# Patient Record
Sex: FEMALE | Race: WHITE | NOT HISPANIC OR LATINO
[De-identification: names, ages, dates, MRNs, and addresses within clinical notes are randomized per-mention and may not be internally consistent; named-entity substitution may affect disease eponyms.]

---

## 2018-06-18 ENCOUNTER — RECORDS - HEALTHEAST (OUTPATIENT)
Dept: ADMINISTRATIVE | Facility: OTHER | Age: 53
End: 2018-06-18

## 2018-08-21 ENCOUNTER — RECORDS - HEALTHEAST (OUTPATIENT)
Dept: RADIOLOGY | Facility: CLINIC | Age: 53
End: 2018-08-21

## 2018-08-23 ENCOUNTER — OFFICE VISIT - HEALTHEAST (OUTPATIENT)
Dept: NEUROSURGERY | Facility: CLINIC | Age: 53
End: 2018-08-23

## 2018-08-23 DIAGNOSIS — M79.605 PAIN OF LEFT LOWER EXTREMITY: ICD-10-CM

## 2018-08-23 RX ORDER — ATORVASTATIN CALCIUM 80 MG/1
TABLET, FILM COATED ORAL
Status: SHIPPED | COMMUNITY
Start: 2018-07-26

## 2018-08-23 RX ORDER — HYDROCODONE BITARTRATE AND ACETAMINOPHEN 5; 325 MG/1; MG/1
1 TABLET ORAL EVERY 4 HOURS PRN
Status: SHIPPED | COMMUNITY
Start: 2018-08-23

## 2018-08-23 ASSESSMENT — MIFFLIN-ST. JEOR: SCORE: 1481.73

## 2018-08-30 ENCOUNTER — RECORDS - HEALTHEAST (OUTPATIENT)
Dept: ADMINISTRATIVE | Facility: OTHER | Age: 53
End: 2018-08-30

## 2018-09-05 ENCOUNTER — HOSPITAL ENCOUNTER (OUTPATIENT)
Dept: PHYSICAL MEDICINE AND REHAB | Facility: CLINIC | Age: 53
Discharge: HOME | End: 2018-09-05
Attending: SURGERY

## 2018-09-05 DIAGNOSIS — Z98.890 PREVIOUS BACK SURGERY: ICD-10-CM

## 2018-09-05 DIAGNOSIS — M47.26 OSTEOARTHRITIS OF SPINE WITH RADICULOPATHY, LUMBAR REGION: ICD-10-CM

## 2018-09-05 DIAGNOSIS — M79.18 MYOFASCIAL PAIN: ICD-10-CM

## 2018-09-05 DIAGNOSIS — M79.662 PAIN OF LEFT LOWER LEG: ICD-10-CM

## 2018-09-05 ASSESSMENT — MIFFLIN-ST. JEOR: SCORE: 1565.98

## 2018-09-12 ENCOUNTER — RECORDS - HEALTHEAST (OUTPATIENT)
Dept: RADIOLOGY | Facility: CLINIC | Age: 53
End: 2018-09-12

## 2018-09-19 ENCOUNTER — COMMUNICATION - HEALTHEAST (OUTPATIENT)
Dept: PHYSICAL MEDICINE AND REHAB | Facility: CLINIC | Age: 53
End: 2018-09-19

## 2018-09-19 ENCOUNTER — AMBULATORY - HEALTHEAST (OUTPATIENT)
Dept: NEUROSURGERY | Facility: CLINIC | Age: 53
End: 2018-09-19

## 2018-09-28 ENCOUNTER — RECORDS - HEALTHEAST (OUTPATIENT)
Dept: ADMINISTRATIVE | Facility: OTHER | Age: 53
End: 2018-09-28

## 2018-10-02 ENCOUNTER — COMMUNICATION - HEALTHEAST (OUTPATIENT)
Dept: PHYSICAL MEDICINE AND REHAB | Facility: CLINIC | Age: 53
End: 2018-10-02

## 2018-10-08 ENCOUNTER — COMMUNICATION - HEALTHEAST (OUTPATIENT)
Dept: PHYSICAL MEDICINE AND REHAB | Facility: CLINIC | Age: 53
End: 2018-10-08

## 2018-10-08 DIAGNOSIS — M79.662 PAIN OF LEFT LOWER LEG: ICD-10-CM

## 2018-10-08 DIAGNOSIS — M79.18 MYOFASCIAL PAIN: ICD-10-CM

## 2018-10-08 DIAGNOSIS — M47.26 OSTEOARTHRITIS OF SPINE WITH RADICULOPATHY, LUMBAR REGION: ICD-10-CM

## 2018-11-14 ENCOUNTER — COMMUNICATION - HEALTHEAST (OUTPATIENT)
Dept: PHYSICAL MEDICINE AND REHAB | Facility: CLINIC | Age: 53
End: 2018-11-14

## 2018-12-13 ENCOUNTER — COMMUNICATION - HEALTHEAST (OUTPATIENT)
Dept: PHYSICAL MEDICINE AND REHAB | Facility: CLINIC | Age: 53
End: 2018-12-13

## 2018-12-13 DIAGNOSIS — M79.662 PAIN OF LEFT LOWER LEG: ICD-10-CM

## 2018-12-13 DIAGNOSIS — M79.18 MYOFASCIAL PAIN: ICD-10-CM

## 2018-12-13 DIAGNOSIS — M47.26 OSTEOARTHRITIS OF SPINE WITH RADICULOPATHY, LUMBAR REGION: ICD-10-CM

## 2018-12-14 ENCOUNTER — COMMUNICATION - HEALTHEAST (OUTPATIENT)
Dept: PHYSICAL MEDICINE AND REHAB | Facility: CLINIC | Age: 53
End: 2018-12-14

## 2019-01-03 ENCOUNTER — COMMUNICATION - HEALTHEAST (OUTPATIENT)
Dept: PHYSICAL MEDICINE AND REHAB | Facility: CLINIC | Age: 54
End: 2019-01-03

## 2019-01-22 ENCOUNTER — HOSPITAL ENCOUNTER (OUTPATIENT)
Dept: PHYSICAL MEDICINE AND REHAB | Facility: CLINIC | Age: 54
Discharge: HOME | End: 2019-01-22
Attending: PAIN MEDICINE

## 2019-01-22 DIAGNOSIS — M79.18 MYOFASCIAL PAIN: ICD-10-CM

## 2019-01-22 DIAGNOSIS — M79.662 PAIN OF LEFT LOWER LEG: ICD-10-CM

## 2019-01-22 DIAGNOSIS — M47.26 OSTEOARTHRITIS OF SPINE WITH RADICULOPATHY, LUMBAR REGION: ICD-10-CM

## 2019-01-22 DIAGNOSIS — M16.12 PRIMARY OSTEOARTHRITIS OF LEFT HIP: ICD-10-CM

## 2019-01-22 DIAGNOSIS — Z98.890 PREVIOUS BACK SURGERY: ICD-10-CM

## 2019-01-22 RX ORDER — ROSUVASTATIN CALCIUM 40 MG/1
40 TABLET, COATED ORAL AT BEDTIME
Status: SHIPPED | COMMUNITY
Start: 2019-01-22

## 2019-01-23 ENCOUNTER — RECORDS - HEALTHEAST (OUTPATIENT)
Dept: ADMINISTRATIVE | Facility: OTHER | Age: 54
End: 2019-01-23

## 2019-02-11 ENCOUNTER — COMMUNICATION - HEALTHEAST (OUTPATIENT)
Dept: PHYSICAL MEDICINE AND REHAB | Facility: CLINIC | Age: 54
End: 2019-02-11

## 2019-02-18 ENCOUNTER — HOSPITAL ENCOUNTER (OUTPATIENT)
Dept: PHYSICAL MEDICINE AND REHAB | Facility: CLINIC | Age: 54
Discharge: HOME | End: 2019-02-18
Attending: PAIN MEDICINE

## 2019-02-18 DIAGNOSIS — M79.662 PAIN OF LEFT LOWER LEG: ICD-10-CM

## 2019-02-18 DIAGNOSIS — M47.26 OSTEOARTHRITIS OF SPINE WITH RADICULOPATHY, LUMBAR REGION: ICD-10-CM

## 2019-02-18 DIAGNOSIS — M79.18 MYOFASCIAL PAIN: ICD-10-CM

## 2019-02-18 DIAGNOSIS — M16.12 PRIMARY OSTEOARTHRITIS OF LEFT HIP: ICD-10-CM

## 2019-02-18 DIAGNOSIS — Z98.890 PREVIOUS BACK SURGERY: ICD-10-CM

## 2019-02-18 RX ORDER — TOPIRAMATE 50 MG/1
100 TABLET, FILM COATED ORAL 2 TIMES DAILY
Qty: 120 TABLET | Refills: 1 | Status: SHIPPED | OUTPATIENT
Start: 2019-02-18

## 2019-03-04 ENCOUNTER — COMMUNICATION - HEALTHEAST (OUTPATIENT)
Dept: PHYSICAL MEDICINE AND REHAB | Facility: CLINIC | Age: 54
End: 2019-03-04

## 2019-03-21 ENCOUNTER — COMMUNICATION - HEALTHEAST (OUTPATIENT)
Dept: PHYSICAL MEDICINE AND REHAB | Facility: CLINIC | Age: 54
End: 2019-03-21

## 2019-03-22 ENCOUNTER — AMBULATORY - HEALTHEAST (OUTPATIENT)
Dept: PHYSICAL MEDICINE AND REHAB | Facility: CLINIC | Age: 54
End: 2019-03-22

## 2019-03-22 DIAGNOSIS — M16.12 PRIMARY OSTEOARTHRITIS OF LEFT HIP: ICD-10-CM

## 2019-04-05 ENCOUNTER — HOSPITAL ENCOUNTER (OUTPATIENT)
Dept: PHYSICAL MEDICINE AND REHAB | Facility: CLINIC | Age: 54
Discharge: HOME | End: 2019-04-05
Attending: PAIN MEDICINE

## 2019-04-05 DIAGNOSIS — M16.12 PRIMARY OSTEOARTHRITIS OF LEFT HIP: ICD-10-CM

## 2019-04-11 ENCOUNTER — COMMUNICATION - HEALTHEAST (OUTPATIENT)
Dept: PHYSICAL MEDICINE AND REHAB | Facility: CLINIC | Age: 54
End: 2019-04-11

## 2019-04-11 DIAGNOSIS — M16.12 OSTEOARTHRITIS OF LEFT HIP: ICD-10-CM

## 2019-04-12 ENCOUNTER — RECORDS - HEALTHEAST (OUTPATIENT)
Dept: ADMINISTRATIVE | Facility: OTHER | Age: 54
End: 2019-04-12

## 2019-04-24 ENCOUNTER — HOSPITAL ENCOUNTER (OUTPATIENT)
Dept: PHYSICAL MEDICINE AND REHAB | Facility: CLINIC | Age: 54
Discharge: HOME | End: 2019-04-24
Attending: PAIN MEDICINE

## 2019-04-24 DIAGNOSIS — M16.12 OSTEOARTHRITIS OF LEFT HIP: ICD-10-CM

## 2019-07-05 ENCOUNTER — COMMUNICATION - HEALTHEAST (OUTPATIENT)
Dept: PHYSICAL MEDICINE AND REHAB | Facility: CLINIC | Age: 54
End: 2019-07-05

## 2019-07-05 DIAGNOSIS — M79.18 MYOFASCIAL PAIN: ICD-10-CM

## 2019-07-05 DIAGNOSIS — M79.662 PAIN OF LEFT LOWER LEG: ICD-10-CM

## 2019-07-05 DIAGNOSIS — M47.26 OSTEOARTHRITIS OF SPINE WITH RADICULOPATHY, LUMBAR REGION: ICD-10-CM

## 2019-07-05 RX ORDER — GABAPENTIN 100 MG/1
CAPSULE ORAL
Qty: 720 CAPSULE | Refills: 1 | Status: SHIPPED | OUTPATIENT
Start: 2019-07-05

## 2021-05-26 NOTE — TELEPHONE ENCOUNTER
Pt returned call. Discussed provider's response. Pt would like to proceed with the diagnostic hip injection under US. Pt will have  drive her the day of procedure. Transferred to scheduling to make appt.

## 2021-05-26 NOTE — TELEPHONE ENCOUNTER
Please call the patient and let her know that I would like to do a diagnostic(numbing medication only) left hip injection under ultrasound guidance?  If this helps then I would repeat the injection with steroid.  I will order the injection.  Could you please have her scheduled for the injection.

## 2021-05-27 NOTE — PATIENT INSTRUCTIONS - HE
DISCHARGE INSTRUCTIONS    During office hours (8:00 a.m.- 4:30 p.m.) questions or concerns may be answered  by calling  [phone] or spine navigation [phone]. If you experience any problems after hours  please call [phone] and you will be connected to [facility]     All Patients:  ? You may experience an increase in your symptoms for the first 2 days, once the numbing medication wears off.    ? You may resume your regular medication, no pain medication until you have completed your diary    ? You may shower. No swimming, tub bath or hot tub for 24 hours; remove bandage after 4 hours                       POSSIBLE PROCEDURE SIDE EFFECTS  -Call Spine Center if concerned-    Increased Pain  Increased numbness/tingling     Nausea/Vomiting  Bruising/bleeding at site (hematoma)             Swelling at site (edema) Headache  Difficulty walking  Infection        Fever greater than 100.5

## 2021-05-28 NOTE — PATIENT INSTRUCTIONS - HE
DISCHARGE INSTRUCTIONS    During office hours (8:00 a.m.- 4:30 p.m.) questions or concerns may be answered  by calling Spine Navigation Nurses at  [phone].     If you experience any problems after hours  please call [phone] and you will be connected to [facility].     All Patients:    ? You may experience an increase in your symptoms for the first 2 days (It may take anywhere between 2 days- 2 weeks for the steroid to have maximum effect).    ? You may use ice on the injection site, as frequently as 20 minutes each hour if needed.    ? You may take your pain medicine.    ? You may continue taking your regular medication after your injection. If you have had a Medial Branch Block you may resume pain medication once your pain diary is completed.    ? You may shower. No swimming, tub bath or hot tub for 48 hours.  You may remove your bandaid/bandage as soon as you are home.    ? You may resume light activities, as tolerated.    ? Resume your usual diet as tolerated.    ? It is strongly advised that you do not drive for 1-3 hours post injection.    ? If you have had oral sedation:  Do not drive for 8 hours post injection.      ? If you have had IV sedation:  Do not drive for 24 hours post injection.  Do not operate hazardous machinery or make important personal/business decisions for 24 hours.      POSSIBLE STEROID SIDE EFFECTS (If steroid/cortisone was used for your procedure)    -If you experience these symptoms, it should only last for a short period      Swelling of the legs                Skin redness (flushing)       Mouth (oral) irritation     Blood sugar (glucose) levels              Sweats                      Mood changes    Headache    Sleeplessness         POSSIBLE PROCEDURE SIDE EFFECTS  -Call the Spine Center if you are concerned    Increased Pain             Increased numbness/tingling        Nausea/Vomiting            Bruising/bleeding at site        Redness or swelling                                                 Difficulty walking        Weakness             Fever greater than 100.5    *In the event of a severe headache after an epidural steroid injection that is relieved by lying down, please call the [facility] to speak with a clinical staff member*

## 2021-06-01 VITALS — WEIGHT: 187 LBS | HEIGHT: 68 IN | BODY MASS INDEX: 28.34 KG/M2

## 2021-06-01 VITALS — HEIGHT: 67 IN | BODY MASS INDEX: 32.68 KG/M2 | WEIGHT: 208.2 LBS

## 2021-06-02 VITALS — WEIGHT: 215.1 LBS | BODY MASS INDEX: 33.44 KG/M2

## 2021-06-02 VITALS — BODY MASS INDEX: 33.42 KG/M2 | WEIGHT: 215 LBS

## 2021-06-03 VITALS — WEIGHT: 215 LBS | BODY MASS INDEX: 33.42 KG/M2

## 2021-06-20 NOTE — PROGRESS NOTES
Patient is a 52-year-old female.  She complains of left leg pain.  Onset 3 months ago.  Minimal back pain.  No right leg symptoms.  In addition to the left leg pain she also complains of left leg weakness and numbness.  She has not had any recent conservative treatment like physical therapy or steroids.  She has a history of 2 lumbar laminectomies for synovial cysts.  The second was done by Dr. Yi at [facility].  It helped.  Past medical history is negative.  No heart disease, lung disease, cancer, diabetes.  On exam her BMI is 28.  She limps on her right leg which is opposite of  what one would expect with left leg pain.  She has trouble walking on her tiptoes.  Heel walking is okay.  Reflexes are active and symmetrical.  Good strength.  No atrophy.  Sensation intact.  Straight leg raising positive on the left.  Yonny's sign negative.  MRI looks fairly unremarkable to me.  There is minimal listhesis at L4-5.  The disc at that level looks healthy.  The foramen on the left is patent.  No synovial cyst compressing the left nerve.  There is a small synovial cyst on the asymptomatic, right side.  A doctor from [facility] recommended a fusion but that does not make sense to me.  I would try some conservative treatment at the [facility] spine center.  Probably try an epidural or transforaminal steroid injection.  I would get some flexion-extension views to see if there is any abnormal motion at L4-5.  I would get some thin CT cuts to look for a pars defect.  The cause of the left leg pain remains unknown.  Maybe a neurologist and EMG could help.  I showed the MRI pictures to the patient and her .  They are satisfied with the plan.  Total time 45 minutes, more than 50% spent counseling and/or coordinating care.

## 2021-06-20 NOTE — PROGRESS NOTES
ASSESSMENT: Erna Soriano is a 53 y.o. female who presents for consultation at the request of HE PCP Danica Quiñones PA-C, with a past medical history significant for lumbar facet cyst removal in 2013 and 14, hyperlipidemia, left leg pain who presents today for new patient evaluation of:    -Overall the patient's physical exam is very reassuring that she has normal strength and reflexes.  Does seem that portion of her pain is myofascial.  Also possible that some of the pain could be coming from foraminal stenosis at L4-5.    Patient is neurologically intact on exam. No myelopathic or red flag symptoms.     LEROY Score: 54    WHO 5: 16       Diagnoses and all orders for this visit:    Pain of left lower leg    Osteoarthritis of spine with radiculopathy, lumbar region    Myofascial pain      PLAN:  Reviewed spine anatomy and disease process. Discussed diagnosis and treatment options with the patient today. A shared decision making model was used.  The patient's values and choices were respected. The following represents what was discussed and decided upon by the provider and the patient.      -DIAGNOSTIC TESTS:  Images were personally reviewed and interpreted and explained to patient today using spine model.   --Personally reviewed and interpreted the CT of the lumbar spine which does show a spondylolisthesis of L3 on L4 and L4 on L5.  Postsurgical changes of the laminectomy at left L4-5 as well as mild central spinal canal stenosis at L4-5.  There is also mild to moderate neural foraminal stenosis bilaterally at L4-5.  She has moderate facet arthropathy on the right at L5-S1.  --Reviewed the report of the flexion and extension x-rays of her lumbar spine on 8/28/2018 which does show a 7 mm anterolisthesis of L4 on L5.  They are unchanged with flexion and extension.  There is also a 3 mm retrolisthesis of L3 on L4 which is also unchanged with flexion and extension.  --I also personally reviewed and interpreted images  from 8/21/2018 of an MRI of the lumbar spine.  It does again show mild central canal stenosis at L4-5 as well as bilateral foraminal stenosis at L4-5.  There is facet arthropathy at L5-S1.  --She will be seeing neurology and likely have an EMG study done.  --I have ordered a BMP to look at renal function as she is taking ibuprofen.    -PHYSICAL THERAPY: I have ordered physical therapy for the patient to work on core strengthening as well as nerve gliding.  Thus that the patient be given a home-going exercise program that she can do on a daily basis.    Discussed the importance of core strengthening, ROM, stretching exercises with the patient and how each of these entities is important in decreasing pain.  Explained to the patient that the purpose of physical therapy is to teach the patient a home exercise program.  These exercises need to be performed every day in order to decrease pain and prevent future occurrences of pain.        -MEDICATIONS: Start the patient on gabapentin 100 mg at bedtime she will increase in a stepwise fashion until she is at 300 mg 3 times a day.  We discussed that she could safely increase to 600 mg 3 times a day as tolerated.    Discussed multiple medication options today with patient. Discussed risks, side effects, and proper use of medications. Patient verbalized understanding.    -INTERVENTIONS: No interventions at this time.  In the future could consider a left L4-5 transforaminal epidural steroid injection.  Discussed risks and benefits of injections with patient today.    -PATIENT EDUCATION:  45 minutes of total visit time was spent face to face with the patient today, greater than 50% of total time spent with patient was spent on counseling, education, and coordinating care.   -10 minutes spent outside of visit time, non-face-to-face time, reviewing chart.    -FOLLOW-UP:   I will follow-up with the patient in 4 weeks.    Advised patient to call the Spine Center if symptoms worsen  or you have problems controlling bladder and bowel function.   ______________________________________________________________________    SUBJECTIVE:  HPI:  Erna Soriano  Is a 53 y.o. female who presents today for new patient evaluation of primarily left buttock and occasional left lower limb pain patient reports that she has had 2 lumbar surgeries one in 2013 and another in 2014 for cyst removal in the lumbar spine.  At that time she was having left lower limb pain and describes that it was in her left buttock down the posterior thigh and posterior calf.  After removal of the cyst in 2014 she had been pain-free in her left lower limb until about May 2018.  Since that time she has had worsening pain in her left buttock and posterior thigh and calf.  She reports is similar to the pain that she had prior to surgery.  She met with Dr. Denney in neurosurgery he felt that a more conservative approach to her treatment plan would be best.  Patient has not had physical therapy nor any recent injections.  She is currently taking ibuprofen 600-800 mg 3-4 times daily.  This does help her pain a little bit.  She was on Vicodin which she felt helped a little bit more but is no longer taking this.  Her pain is currently 10 out of 10 and describes the pain as throbbing and achy.  90% of her pain is in her left buttock area with remaining 10% down her leg.  Prior to her last surgery she had tried gabapentin and does not remember what dose she was on but does remember that she felt that she was woozy on this medication.    -Treatment to Date:Prior to her second lumbar surgery she did have a cortisone injection as well as a cyst rupture procedure with no long-lasting results L4-5 laminectomy for cyst removal in 2013 and 14.  She has not had physical therapy.    -Medications:    Current Outpatient Prescriptions on File Prior to Encounter   Medication Sig Dispense Refill     atorvastatin (LIPITOR) 80 MG tablet TAKE 1 TABLET BY ORAL  "ROUTE EVERY DAY       HYDROcodone-acetaminophen 5-325 mg per tablet Take 1 tablet by mouth every 4 (four) hours as needed for pain.       No current facility-administered medications on file prior to encounter.        No Known Allergies    Past Medical History:   Diagnosis Date     Hypertension       Surgical history  Left L4-5 laminectomy for cyst removal in 2013 and 14.    Family History   Problem Relation Age of Onset     Cancer Mother      Hypertension Father      Hypertension Brother        Reviewed past medical, surgical, and family history with patient found on new patient intake packet located in EMR Media tab.     SOCIAL HX: She is  and works for [employer].  She does not smoke, drinks alcohol occasionally, and does not use recreational drugs.    ROS:  Specifically negative for bowel/bladder dysfunction, balance changes, headache, dizziness, foot drop, fevers, chills, appetite changes, nausea/vomiting, unexplained weight loss. Otherwise 13 systems reviewed are negative. Please see the patient's intake questionnaire from today for details.    OBJECTIVE:  /88  Pulse 92  Resp 19  Ht 5' 7.25\" (1.708 m)  Wt 208 lb 3.2 oz (94.4 kg)  SpO2 96%  BMI 32.37 kg/m2    PHYSICAL EXAMINATION:    --CONSTITUTIONAL:  Vital signs as above.  No acute distress.  The patient is well nourished and well groomed.  --PSYCHIATRIC:  Appropriate mood and affect. The patient is awake, alert, oriented to person, place, time and answering questions appropriately with clear speech.    --SKIN:  Skin over the face, bilateral lower extremities, and posterior torso is clean, dry, intact without rashes.    --RESPIRATORY: Normal rhythm and effort. No abnormal accessory muscle breathing patterns noted.   --ABDOMINAL:  Soft, non-distended, non-tender throughout all quadrants.   --STANDING EXAMINATION:  Normal lumbar lordosis noted, no lateral shift.  --MUSCULOSKELETAL: Lumbar spine inspection reveals no evidence " of deformity. Range of motion is not limited in lumbar flexion, extension, lateral rotation.  She does have increased buttock pain on the left side with flexion and extension.  Mild tenderness to palpation in the left buttock.  Straight leg raise is negative bilaterally.  --SACROILIAC JOINT: Right Giorgio's with reproduction of pain to affected extremity.  Negative Gaenslen's Test with reproduction of pain to affected extremity.  --GROSS MOTOR: Gait is non-antalgic. Easily arises from a seated position. Toe walking and heel walking are normal without significant difficulty.    --LOWER EXTREMITY MOTOR TESTING:  Plantar flexion left 5/5, right 5/5   Dorsiflexion left 5/5, right 5/5   Great toe MTP extension left 5/5, right 5/5  Knee flexion left 5/5, right 5/5  Knee extension left 5/5, right 5/5   Hip flexion left 5/5, right 5/5  Hip abduction left 5/5, right 5/5  Hip adduction left 5/5, right 5/5   --HIPS: Full range of motion bilaterally.  Stinchfield test is negative bilaterally  --NEUROLOGICAL:  2/4 patellar, medial hamstring, and achilles reflexes bilaterally.  Sensation to light touch is intact in the bilateral L4, L5, and S1 dermatomes. Babinski is negative. No clonus.  Negative Alfonso reflex bilaterally.  --VASCULAR:  2/4 dorsalis pedis and posterior tibialsi pulses bilaterally.  Bilateral lower extremities are warm.  There is no pitting edema of the bilateral lower extremities.    RESULTS: Prior medical records from [facility] neurosurgery were reviewed today.    Imaging: Lumbar spine Imaging was personally reviewed and interpreted today. The images were shown to the patient and the findings were explained using a spine model.

## 2021-06-20 NOTE — PROGRESS NOTES
Neurosurgery consultation was requested by: a friend a surgery nurse   Pain: back   Radicular Pain is present: lower back goes down to her left leg to her toes  Lhermitte sign: no  Motor complaints: left leg   Sensory complaints: left leg   Gait and balance issues: yes   Bowel or bladder issues: no  Duration of SX is: 3 months   The symptoms are worse with: everything   The symptoms are better with: nothing   Injury: cyst   Severity is: mild to moderate   Patient has tried the following conservative measures: 1 injection lasted for a week   LEROY score is: 50%  JERSON Polanco MA

## 2021-06-22 NOTE — TELEPHONE ENCOUNTER
Patient called back to request a new MRI. She stated that she had a cyst in the past and is curious if it has returned. She also stated that she absolutely does not want to increase the Gabapentin. She will be weaning off of it due to weight gain and ineffectiveness.     Provided providers recommendations, patient would be willing to come in for a follow-up but would like to know if the provider would order a MRI first. If so, she would like it to go to [facility].     Informed patient that her message would be given to the provider and she would be called back. Patient understood and call was ended.

## 2021-06-22 NOTE — TELEPHONE ENCOUNTER
Could you please schedule a follow up appointment with the patient, I haven't seen her since September?  If she has had at least 4 sessions of PT in the last 6 months, we could also schedule a left L4-5 transforaminal epidural steroid injection on the same day directly after the appointment.      I would recommend that the patient slowly increase the amount of gabapentin until she is on 600 mg three times a day.  She could add a dose every 3rd day, similar to how she went up originally.    Thanks,    Sean

## 2021-06-22 NOTE — TELEPHONE ENCOUNTER
Patient would like to move forward with the L4-5 TFESI. She is currently taking Gabapentin 100 mg 3x3x3 which is no longer providing pain control. She will begin tapering down today.     Informed patient that her message would be sent to the provider. Patient is aware that if injection is ordered she may need to wait for prior auth. Last PT @ [facility] 9/2018.

## 2021-06-22 NOTE — TELEPHONE ENCOUNTER
Patient returned call. Wondering about an MRI and if it would include her groin area as she is now experiencing pain there too. Explained an MRI has not been ordered and she needs to make a follow up for provider to determine which MRI, if any, would be ordered. Stated understanding.     She did mention the injection that was offered to her. Patient however has only had 2 sessions of PT and then given HEP. She was told to return if pain worsened which it has. Encouraged her to reach out to PT and ask fo a couple more sessions of PT. Stated understanding. Transferred to scheduling to make follow up appointment.

## 2021-06-23 NOTE — PATIENT INSTRUCTIONS - HE
Recommend that he continue doing physical therapy sessions to strengthen core.  Please do the exercises on a daily basis.    We will hold off on getting an MRI at this point.    I have ordered Topamax for you please take 50 mg at bedtime for 1 week and then at that point take 50 mg in the morning and at bedtime.  After taking this for a week you can go ahead and wean off of gabapentin completely.    ~Please call Nurse Navigation line [phone] with any questions or concerns about your treatment plan, if symptoms worsen and you would like to be seen urgently, or if you have problems controlling bladder and bowel function.

## 2021-06-23 NOTE — TELEPHONE ENCOUNTER
Call to pt. Pt reports she is not having any respiratory problems with this medication. Informed pt of provider's recommendations. She will stop and get an antihistamine to see if this is helpful. Advised pt to call back if any other issues arise. Transferred to scheduling to make follow up appt.

## 2021-06-23 NOTE — TELEPHONE ENCOUNTER
"Call from pt to give status update since last appt on 1/22 with Dr. Johnson. Pt states at this appt she was prescribed Topamax 50 mg. She began this medication on 1/23, 50 mg at bedtime for 1 week and then titrated up to 50 mg 2x/daily. Pt reports \"this medication really does seem to work and I really like it, I've just been getting this itchiness, prickliness and dry skin lately from it\". Pt reports this occurs on her arms, back and face. She states \"it's not bad or anything by all means, but it's just enough to be annoying. The medication really helps me but I'm just wondering if there is anything I can do to help with this? It hasn't been that long since I started taking it so is this something that will go away eventually or do I need to be on a different medication?\"     Informed pt that provider would be updated. Encouraged pt to monitor her symptoms and stop taking mediation if symptoms worsen.   "

## 2021-06-23 NOTE — TELEPHONE ENCOUNTER
Could you please call the patient and let her know that I would like her to continue taking the Topamax as long as she is not having any respiratory problems with this?  It be fine for her if she wanted to take an antihistamine like Claritin to see if this is helpful.  I will follow-up with her as scheduled.

## 2021-06-23 NOTE — PROGRESS NOTES
Assessment:     Diagnoses and all orders for this visit:    Osteoarthritis of spine with radiculopathy, lumbar region  -     topiramate (TOPAMAX) 50 MG tablet  Dispense: 60 tablet; Refill: 2    Previous back surgery  -     topiramate (TOPAMAX) 50 MG tablet  Dispense: 60 tablet; Refill: 2    Myofascial pain  -     topiramate (TOPAMAX) 50 MG tablet  Dispense: 60 tablet; Refill: 2    Pain of left lower leg  -     topiramate (TOPAMAX) 50 MG tablet  Dispense: 60 tablet; Refill: 2    Primary osteoarthritis of left hip  -     topiramate (TOPAMAX) 50 MG tablet  Dispense: 60 tablet; Refill: 2       Erna Soriano is a 53 y.o. y.o. female with past medical history significant for lumbar facet cyst removal in 2013 and 14, hyperlipidemia, left leg pain who presents today for follow-up regarding:    -Patient continues to have low back and left posterior leg pain.  Does sound radicular in nature.  She is also having left groin pain that is likely from left hip osteoarthritis based on CT imaging as well as left hip provocative maneuvers being positive.     Plan:     A shared decision making plan was used. The patient's values and choices were respected. Prior medical records from our last visit on 9/5/2018 were reviewed today. The following represents what was discussed and decided upon by the provider and the patient.        -DIAGNOSTIC TESTS: Images were personally reviewed and interpreted.   --Personally reviewed and interpreted the CT of the lumbar spine which does show a spondylolisthesis of L3 on L4 and L4 on L5.  Postsurgical changes of the laminectomy at left L4-5 as well as mild central spinal canal stenosis at L4-5.  There is also mild to moderate neural foraminal stenosis bilaterally at L4-5.  She has moderate facet arthropathy on the right at L5-S1.  It also shows left hip osteoarthritis mild to moderate nature.  --Reviewed the report of the flexion and extension x-rays of her lumbar spine on 8/28/2018 which does show  a 7 mm anterolisthesis of L4 on L5.  They are unchanged with flexion and extension.  There is also a 3 mm retrolisthesis of L3 on L4 which is also unchanged with flexion and extension.  --I also personally reviewed and interpreted images from 8/21/2018 of an MRI of the lumbar spine.  It does again show mild central canal stenosis at L4-5 as well as bilateral foraminal stenosis at L4-5.  There is facet arthropathy at L5-S1.  --At this time will hold off on getting repeat MRI as pain is in the same distribution as to prior MRI in June 2018.    -INTERVENTIONS: No interventions at this time.  Could consider diagnostic ultrasound-guided left hip injection in the future.  Could also consider left L4-5 transforaminal epidural steroid injection should pain worsen.    -MEDICATIONS: Patient had good pain relief with gabapentin 300 mg 3 times a day, however unfortunately had weight gain with this medication.  She is currently on 200 mg in the morning 100 mg in the afternoon and 200 mg of gabapentin in the evening.  She is tried to taper off this medication, however did feel a bit nauseous while doing this so has continued on this dose.  I recommend that she start topiramate 50 mg twice a day.  She will start with 50 mg at bedtime for a week and then continue on with 50 mg twice a day.  When she is started this she could taper off gabapentin in a slow fashion.  -  Discussed side effects of medications and proper use. Patient verbalized understanding.    -PHYSICAL THERAPY: Patient had 2 sessions of physical therapy.  I recommend that she continue going to physical therapy and working on exercise program on a daily basis.    Discussed the importance of core strengthening, ROM, stretching exercises with the patient and how each of these entities is important in decreasing pain.  Explained to the patient that the purpose of physical therapy is to teach the patient a home exercise program.  These exercises need to be performed every  day in order to decrease pain and prevent future occurrences of pain.        -PATIENT EDUCATION:  20 minutes of total visit time was spent face to face with the patient today, 60 % of the visit was spent on counseling, education, and coordinating care.   -5 minutes spent outside of visit time, non-face-to-face time, reviewing chart.    -FOLLOW UP: The patient will follow-up in 4 weeks.  Advised to contact clinic if symptoms worsen or change.    Subjective:     Erna Soriano is a 53 y.o. female who presents today for follow-up regarding low back and left lower limb pain.  Patient reports that about a week to week and a half ago pain started to increase.  Pain is since subsided some.  Pain is worse or left buttock left posterior thigh and calf.  She is having occasional left groin pain which is worsening.  She reports that the pain is achy in nature at times shooting.  She denies any bowel or bladder changes, fevers, chills, unintentional weight loss.  Patient reports that weather can increase pain depending on temperature.  She denies any history of renal stones.  She denies any weakness.  Her pain currently is 2/10.    Evaluation to Date: Flexion-extension x-rays done on 8/28/2018 of the lumbar spine.  MRI of the lumbar spine dated 8/21/2018.    Treatment to Date: Prior to her second lumbar surgery she did have a cortisone injection as well as a cyst rupture procedure with no long-lasting results L4-5 laminectomy for cyst removal in 2013 and 14.  She has not had physical therapy    There is no problem list on file for this patient.      Current Outpatient Medications on File Prior to Encounter   Medication Sig Dispense Refill     gabapentin (NEURONTIN) 100 MG capsule Take 300 mg in the morning, 200 mg in the afternoon, and 300 mg at bedtime. (Patient taking differently: Take 100 mg by mouth. Patient is taking 2/1/2 as of 1/22/19      ) 720 capsule 1     rosuvastatin (CRESTOR) 40 MG tablet Take 40 mg by mouth at  bedtime.       atorvastatin (LIPITOR) 80 MG tablet TAKE 1 TABLET BY ORAL ROUTE EVERY DAY       HYDROcodone-acetaminophen 5-325 mg per tablet Take 1 tablet by mouth every 4 (four) hours as needed for pain.       No current facility-administered medications on file prior to encounter.        No Known Allergies    Past Medical History:   Diagnosis Date     Hypertension         Review of Systems  ROS:   Specifically negative for bowel/bladder dysfunction, balance changes, headache, dizziness, foot drop, fevers, chills, appetite changes, nausea/vomiting, unexplained weight loss. Otherwise 13 systems reviewed are negative. Please see the patient's intake questionnaire from today for details.    Reviewed Social, Family, Past Medical and Past Surgical history with patient, no significant changes noted since prior visit.     Objective:     /86   Pulse 86   Temp 98.8  F (37.1  C) (Oral)   Resp 18   Wt 215 lb 1.6 oz (97.6 kg)   SpO2 96%   BMI 33.44 kg/m      PHYSICAL EXAMINATION:    --CONSTITUTIONAL: Well developed, well nourished, healthy appearing individual.  --PSYCHIATRIC: Appropriate mood and affect. No difficulty interacting due to temper, social withdrawal, or memory issues.  --SKIN: Lumbar region is dry and intact.   --RESPIRATORY: Normal rhythm and effort. No abnormal accessory muscle breathing patterns noted.   --MUSCULOSKELETAL:  Normal lumbar lordosis noted, no lateral shift.  --GROSS MOTOR: Easily arises from a seated position. Gait is non-antalgic  --LUMBAR SPINE:  Inspection reveals no evidence of deformity. Range of motion is not limited in lumbar flexion, extension, lateral rotation. No tenderness to palpation lumbar spine. Straight leg raising in the supine position is negative to radicular pain. Sciatic notch non-tender.   --SACROILIAC JOINT: Negative distraction.  Groin pain with Giorgio's with reproduction of pain to affected extremity. One Finger point test negative.  --LOWER EXTREMITY MOTOR  TESTING:  Plantar flexion left 5/5, right 5/5   Dorsiflexion left 5/5, right 5/5   Great toe MTP extension left 5/5, right 5/5  Knee flexion left 5/5, right 5/5  Knee extension left 5/5, right 5/5   Hip flexion left 5/5, right 5/5  Hip abduction left 5/5, right 5/5  Hip adduction left 5/5, right 5/5   --HIPS: She has decreased internal rotation of her left hip with reproducible groin and buttock pain.  Stinchfield test is positive on the left for groin and buttock pain.  --NEUROLOGIC: Bilateral patellar and achilles reflexes are physiologic and symmetric. Sensation to light touch is intact in the bilateral L4, L5, and S1 dermatomes.    RESULTS:   Imaging: Lumbar spine imaging was reviewed today.

## 2021-06-24 NOTE — PATIENT INSTRUCTIONS - HE
Take 100 mg of Topamax at bedtime for 1 week we will continue to take 50 mg in the morning.  After 1 week take 100 mg of Topamax twice a day.    After taking the increased dose of Topamax for 2 weeks you could consider taking ibuprofen 400-600 mg up to 3 times a day as needed as well as acetaminophen 1000 mg up to 3 times a day as needed.    Please continue doing her physical therapy exercises on a daily basis and going to your physical therapy sessions.    ~Please call Nurse Navigation line [phone] with any questions or concerns about your treatment plan, if symptoms worsen and you would like to be seen urgently, or if you have problems controlling bladder and bowel function.

## 2021-06-24 NOTE — PROGRESS NOTES
Assessment:     Diagnoses and all orders for this visit:    Osteoarthritis of spine with radiculopathy, lumbar region  -     topiramate (TOPAMAX) 50 MG tablet  Dispense: 120 tablet; Refill: 1    Previous back surgery  -     topiramate (TOPAMAX) 50 MG tablet  Dispense: 120 tablet; Refill: 1    Myofascial pain  -     topiramate (TOPAMAX) 50 MG tablet  Dispense: 120 tablet; Refill: 1    Pain of left lower leg  -     topiramate (TOPAMAX) 50 MG tablet  Dispense: 120 tablet; Refill: 1    Primary osteoarthritis of left hip  -     topiramate (TOPAMAX) 50 MG tablet  Dispense: 120 tablet; Refill: 1       Erna Soriano is a 53 y.o. y.o. female with past medical history significant for lumbar facet cyst removal in 2013 and 14, hyperlipidemia, left leg pain who presents today for follow-up regarding low back pain and left lower limb pain and groin pain:    -Patient reports that Topamax has been helpful with pain control and that she has had a 60% decrease of pain with this medication.  Overall physical exam is continuing to be reassuring that she has normal strength and reflexes.  Pain is likely multifactorial including lumbar radiculopathy and possible left hip arthritis pain.     Plan:     A shared decision making plan was used. The patient's values and choices were respected. Prior medical records from our last visit on 1/22/2019 were reviewed today. The following represents what was discussed and decided upon by the provider and the patient.        -DIAGNOSTIC TESTS: Images were personally reviewed and interpreted.   ----Personally reviewed report of the CT of the lumbar spine which does show a spondylolisthesis of L3 on L4 and L4 on L5.  Postsurgical changes of the laminectomy at left L4-5 as well as mild central spinal canal stenosis at L4-5.  There is also mild to moderate neural foraminal stenosis bilaterally at L4-5.  She has moderate facet arthropathy on the right at L5-S1.  It also shows left hip osteoarthritis mild  to moderate nature.  --Reviewed the report of the flexion and extension x-rays of her lumbar spine on 8/28/2018 which does show a 7 mm anterolisthesis of L4 on L5.  They are unchanged with flexion and extension.  There is also a 3 mm retrolisthesis of L3 on L4 which is also unchanged with flexion and extension.  --I also personally reviewed report from 8/21/2018 of an MRI of the lumbar spine.  It does again show mild central canal stenosis at L4-5 as well as bilateral foraminal stenosis at L4-5.  There is facet arthropathy at L5-S1.  --At this time will hold off on getting repeat MRI as pain is in the same distribution as to prior MRI in June 2018.    -INTERVENTIONS: No interventions at this time.  Could consider diagnostic ultrasound guided left hip injection in the future.  Could also consider left L4-5 transforaminal epidural steroid injection.    -MEDICATIONS: Recommended the patient increase Topamax to 100 mg twice daily.  Recommend that she takes 100 mg in the evening and continue 50 mg in the morning for 7 days and then at that point increase to 100 mg twice a day.  After a couple of weeks she could consider trying ibuprofen and/or acetaminophen up to 3 times a day as needed.  -  Discussed side effects of medications and proper use. Patient verbalized understanding.    -PHYSICAL THERAPY: Patient had 3 sessions of physical therapy and finds that this is helpful.  Recommend she continue doing her home exercises on a daily basis.       -PATIENT EDUCATION:  25 minutes of total visit time was spent face to face with the patient today, 60 % of the visit was spent on counseling, education, and coordinating care.   -5 minutes spent outside of visit time, non-face-to-face time, reviewing chart.    -FOLLOW UP: The patient will follow-up in 4 weeks.  Advised to contact clinic if symptoms worsen or change.    Subjective:     Erna Soriano is a 53 y.o. female who presents today for follow-up regarding left low back,  buttock, posterior thigh and calf pain as well as left groin pain.  Patient reports that Topamax 50 mg twice a day has improved pain by 60%.  She did have some itchiness and continues to have some itchiness with medication, however has no rashes or any respiratory distress.  Taking loratadine daily has been helpful for this.  She has noticed that her appetite is not as great on this medication.  She reports that she continues to have pain in her low back buttock posterior thigh and calf on the left side which is burning in nature.  She is also having some achy groin pain on the left side.  At times her groin pain is more painful than the leg pain.  She has gone to 3 sessions of physical therapy and they are slowly increasing the amount of exercises that they have shown her.  She is no longer taking gabapentin as she was concerned about weight gain on this medication.  She is no longer taking hydrocodone acetaminophen.  Pain today is 3.5-4/10.  She denies any bowel or bladder changes, fevers, chills, unintentional weight loss.  She is not having any numbness and tingling.    Evaluation to Date: Flexion-extension x-rays done on 8/28/2018 of the lumbar spine.  MRI of the lumbar spine dated 8/21/2018.    Treatment to Date: Prior to her second lumbar surgery she did have a cortisone injection as well as a cyst rupture procedure with no long-lasting results L4-5 laminectomy for cyst removal in 2013 and 14.  She has not had physical therapy    There is no problem list on file for this patient.      Current Outpatient Medications on File Prior to Encounter   Medication Sig Dispense Refill     atorvastatin (LIPITOR) 80 MG tablet TAKE 1 TABLET BY ORAL ROUTE EVERY DAY       rosuvastatin (CRESTOR) 40 MG tablet Take 40 mg by mouth at bedtime.       [DISCONTINUED] topiramate (TOPAMAX) 50 MG tablet Take 1 tablet (50 mg total) by mouth 2 (two) times a day. 60 tablet 2     gabapentin (NEURONTIN) 100 MG capsule Take 300 mg in the  morning, 200 mg in the afternoon, and 300 mg at bedtime. (Patient taking differently: Take 100 mg by mouth. Patient is taking 2/1/2 as of 1/22/19      ) 720 capsule 1     HYDROcodone-acetaminophen 5-325 mg per tablet Take 1 tablet by mouth every 4 (four) hours as needed for pain.       No current facility-administered medications on file prior to encounter.        No Known Allergies    Past Medical History:   Diagnosis Date     Hypertension         Review of Systems  ROS:  Specifically negative for bowel/bladder dysfunction, balance changes, headache, dizziness, foot drop, fevers, chills, appetite changes, nausea/vomiting, unexplained weight loss. Otherwise 13 systems reviewed are negative. Please see the patient's intake questionnaire from today for details.    Reviewed Social, Family, Past Medical and Past Surgical history with patient, no significant changes noted since prior visit.     Objective:     /90 (Patient Site: Left Arm, Patient Position: Sitting, Cuff Size: Adult Large)   Pulse 94   Temp 98.4  F (36.9  C) (Oral)   Resp 18   Wt 215 lb (97.5 kg)   SpO2 98%   BMI 33.42 kg/m      PHYSICAL EXAMINATION:    --CONSTITUTIONAL: Well developed, well nourished, healthy appearing individual.  --PSYCHIATRIC: Appropriate mood and affect. No difficulty interacting due to temper, social withdrawal, or memory issues.  --SKIN: Lumbar region is dry and intact.   --RESPIRATORY: Normal rhythm and effort. No abnormal accessory muscle breathing patterns noted.   --MUSCULOSKELETAL:  Normal lumbar lordosis noted, no lateral shift.  --GROSS MOTOR: Easily arises from a seated position. Gait is non-antalgic  --LUMBAR SPINE:  Inspection reveals no evidence of deformity. Range of motion is not limited in lumbar flexion, extension, lateral rotation. No tenderness to palpation lumbar spine.  Facet loading is positive on the left side.  Straight leg raising in the seated position is negative to radicular pain.  --LOWER  EXTREMITY MOTOR TESTING:  Plantar flexion left 5/5, right 5/5   Dorsiflexion left 5/5, right 5/5   Great toe MTP extension left 5/5, right 5/5  Knee flexion left 5/5, right 5/5  Knee extension left 5/5, right 5/5   Hip flexion left 5/5, right 5/5  Hip abduction left 5/5, right 5/5  Hip adduction left 5/5, right 5/5    --NEUROLOGIC: Bilateral patellar and achilles reflexes are physiologic and symmetric. Sensation to light touch is intact in the bilateral L4, L5, and S1 dermatomes.    RESULTS:   Imaging: Lumbar spine imaging was reviewed today.

## 2021-06-24 NOTE — TELEPHONE ENCOUNTER
"Call to pt after pt left VM. Pt reports she has stopped her topamax, as increasing her dose has made the \"prickly, tingly feeling return\". She denies having any respiratory issues at this time. Pt reports she is starting her gabapentin again. She previously stopped it due to weight gain. However, she reports she will be seeing a weight specialist at her PCP office. Advised pt to use gabapentin dosing chart, and to stop increasing dose once she reaches a therapeutic level. Pt verbalized understanding and expressed appreciation for call back.   "

## 2021-06-25 NOTE — TELEPHONE ENCOUNTER
Patient calling to follow up with provider. She states she has stopped the Topamax due to some side effects and she has restarted the Gabapentin. She is up to 3-2-3 currently. She reports she has just completed izzy 4th PT session at [facility] in [place]. Continues to have a lot of left groin pain. Wondering about the hip injection.     Explained provider had wanted her to return to clinic for follow up appointment. She would still like him updated to see if can move forward with anything before that. Please advise.